# Patient Record
Sex: FEMALE | Race: WHITE | Employment: OTHER | URBAN - METROPOLITAN AREA
[De-identification: names, ages, dates, MRNs, and addresses within clinical notes are randomized per-mention and may not be internally consistent; named-entity substitution may affect disease eponyms.]

---

## 2018-06-19 ENCOUNTER — HOSPITAL ENCOUNTER (INPATIENT)
Facility: HOSPITAL | Age: 33
LOS: 3 days | Discharge: HOME | DRG: 418 | End: 2018-06-22
Attending: EMERGENCY MEDICINE | Admitting: HOSPITALIST
Payer: MEDICARE

## 2018-06-19 ENCOUNTER — APPOINTMENT (OUTPATIENT)
Dept: ULTRASOUND IMAGING | Facility: HOSPITAL | Age: 33
DRG: 418 | End: 2018-06-19
Attending: EMERGENCY MEDICINE
Payer: MEDICARE

## 2018-06-19 DIAGNOSIS — K81.9 CHOLECYSTITIS: ICD-10-CM

## 2018-06-19 DIAGNOSIS — K80.50 CHOLEDOCHOLITHIASIS: Primary | ICD-10-CM

## 2018-06-19 PROBLEM — R79.89 AZOTEMIA: Status: ACTIVE | Noted: 2018-06-19

## 2018-06-19 PROCEDURE — 76700 US EXAM ABDOM COMPLETE: CPT | Performed by: EMERGENCY MEDICINE

## 2018-06-19 PROCEDURE — 99223 1ST HOSP IP/OBS HIGH 75: CPT | Performed by: HOSPITALIST

## 2018-06-19 RX ORDER — SODIUM CHLORIDE 9 MG/ML
INJECTION, SOLUTION INTRAVENOUS CONTINUOUS
Status: DISCONTINUED | OUTPATIENT
Start: 2018-06-19 | End: 2018-06-22

## 2018-06-19 RX ORDER — ONDANSETRON 2 MG/ML
4 INJECTION INTRAMUSCULAR; INTRAVENOUS EVERY 6 HOURS PRN
Status: DISCONTINUED | OUTPATIENT
Start: 2018-06-19 | End: 2018-06-22

## 2018-06-19 RX ORDER — MULTIVITAMIN WITH FOLIC ACID 400 MCG
1 TABLET ORAL DAILY
COMMUNITY

## 2018-06-19 RX ORDER — HYDROMORPHONE HYDROCHLORIDE 1 MG/ML
0.2 INJECTION, SOLUTION INTRAMUSCULAR; INTRAVENOUS; SUBCUTANEOUS EVERY 2 HOUR PRN
Status: DISCONTINUED | OUTPATIENT
Start: 2018-06-19 | End: 2018-06-22

## 2018-06-19 RX ORDER — GARLIC EXTRACT 500 MG
1 CAPSULE ORAL DAILY
COMMUNITY

## 2018-06-19 RX ORDER — ONDANSETRON 2 MG/ML
4 INJECTION INTRAMUSCULAR; INTRAVENOUS EVERY 4 HOURS PRN
Status: DISCONTINUED | OUTPATIENT
Start: 2018-06-19 | End: 2018-06-19

## 2018-06-19 RX ORDER — HYDROMORPHONE HYDROCHLORIDE 1 MG/ML
0.4 INJECTION, SOLUTION INTRAMUSCULAR; INTRAVENOUS; SUBCUTANEOUS EVERY 2 HOUR PRN
Status: DISCONTINUED | OUTPATIENT
Start: 2018-06-19 | End: 2018-06-22

## 2018-06-19 RX ORDER — HYDROMORPHONE HYDROCHLORIDE 1 MG/ML
0.8 INJECTION, SOLUTION INTRAMUSCULAR; INTRAVENOUS; SUBCUTANEOUS EVERY 2 HOUR PRN
Status: DISCONTINUED | OUTPATIENT
Start: 2018-06-19 | End: 2018-06-22

## 2018-06-19 RX ORDER — SODIUM CHLORIDE 9 MG/ML
INJECTION, SOLUTION INTRAVENOUS CONTINUOUS
Status: ACTIVE | OUTPATIENT
Start: 2018-06-19 | End: 2018-06-19

## 2018-06-19 RX ORDER — KETOROLAC TROMETHAMINE 30 MG/ML
15 INJECTION, SOLUTION INTRAMUSCULAR; INTRAVENOUS ONCE
Status: COMPLETED | OUTPATIENT
Start: 2018-06-19 | End: 2018-06-19

## 2018-06-19 RX ORDER — HYDROMORPHONE HYDROCHLORIDE 1 MG/ML
0.5 INJECTION, SOLUTION INTRAMUSCULAR; INTRAVENOUS; SUBCUTANEOUS EVERY 30 MIN PRN
Status: DISCONTINUED | OUTPATIENT
Start: 2018-06-19 | End: 2018-06-19

## 2018-06-19 NOTE — ED INITIAL ASSESSMENT (HPI)
Pt states she had a sudden onset of abdominal pain started 30 minutes ago. Just finished lunch and started with pain. Samir SPENCER.

## 2018-06-19 NOTE — ED PROVIDER NOTES
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   Cardiovascular: Regular rate and rhythm, normal S1-S2. Respiratory: Lungs are clear to auscultation bilaterally. Abdomen: Soft, tender to the mid abdomen, nondistended. Back: No CVA tenderness. Extremities: No edema.   Skin: warm and dry, no they understand and agree. Disposition and Plan     Clinical Impression:  Choledocholithiasis  (primary encounter diagnosis)    Disposition:  Admit  6/19/2018  4:22 pm    Follow-up:  No follow-up provider specified.       Medications Prescribed:  There a

## 2018-06-19 NOTE — ED NOTES
Pt reevaluated by er physician. Pt informed of all test reports and plan of care. Pt verbalizing understanding.

## 2018-06-19 NOTE — H&P
[facility]  History and Physical     Abby Munoz Patient Status:  Emergency    1985 MRN [id number]   Location [address] [facility] Attending Kathryn Hearn MD   Hosp Day # 0 PCP Foreign Silva     Chief Compl Appropriate mood and affect.       Diagnostic Data:      Labs:  Recent Labs   Lab  06/19/18   1347   WBC  5.6   HGB  13.8   MCV  96.7   PLT  209.0       Recent Labs   Lab  06/19/18   1347   GLU  88   BUN  14   CREATSERUM  0.63   GFRAA  136   GFRNAA  118   C

## 2018-06-20 ENCOUNTER — SURGERY (OUTPATIENT)
Age: 33
End: 2018-06-20

## 2018-06-20 ENCOUNTER — APPOINTMENT (OUTPATIENT)
Dept: GENERAL RADIOLOGY | Facility: HOSPITAL | Age: 33
DRG: 418 | End: 2018-06-20
Attending: SURGERY
Payer: MEDICARE

## 2018-06-20 ENCOUNTER — APPOINTMENT (OUTPATIENT)
Dept: MRI IMAGING | Facility: HOSPITAL | Age: 33
DRG: 418 | End: 2018-06-20
Attending: NURSE PRACTITIONER
Payer: MEDICARE

## 2018-06-20 ENCOUNTER — ANESTHESIA (OUTPATIENT)
Dept: SURGERY | Facility: HOSPITAL | Age: 33
End: 2018-06-20

## 2018-06-20 ENCOUNTER — ANESTHESIA EVENT (OUTPATIENT)
Dept: SURGERY | Facility: HOSPITAL | Age: 33
End: 2018-06-20

## 2018-06-20 PROCEDURE — 76376 3D RENDER W/INTRP POSTPROCES: CPT | Performed by: NURSE PRACTITIONER

## 2018-06-20 PROCEDURE — 0FT44ZZ RESECTION OF GALLBLADDER, PERCUTANEOUS ENDOSCOPIC APPROACH: ICD-10-PCS | Performed by: SURGERY

## 2018-06-20 PROCEDURE — 74181 MRI ABDOMEN W/O CONTRAST: CPT | Performed by: NURSE PRACTITIONER

## 2018-06-20 PROCEDURE — 99232 SBSQ HOSP IP/OBS MODERATE 35: CPT | Performed by: HOSPITALIST

## 2018-06-20 PROCEDURE — 74300 X-RAY BILE DUCTS/PANCREAS: CPT | Performed by: SURGERY

## 2018-06-20 PROCEDURE — 99223 1ST HOSP IP/OBS HIGH 75: CPT | Performed by: SURGERY

## 2018-06-20 PROCEDURE — BF131ZZ FLUOROSCOPY OF GALLBLADDER AND BILE DUCTS USING LOW OSMOLAR CONTRAST: ICD-10-PCS | Performed by: SURGERY

## 2018-06-20 PROCEDURE — 76377 3D RENDER W/INTRP POSTPROCES: CPT | Performed by: NURSE PRACTITIONER

## 2018-06-20 RX ORDER — BUPIVACAINE HYDROCHLORIDE 5 MG/ML
INJECTION, SOLUTION EPIDURAL; INTRACAUDAL AS NEEDED
Status: DISCONTINUED | OUTPATIENT
Start: 2018-06-20 | End: 2018-06-20 | Stop reason: HOSPADM

## 2018-06-20 RX ORDER — CEFOXITIN 1 G/1
INJECTION, POWDER, FOR SOLUTION INTRAVENOUS
Status: DISCONTINUED | OUTPATIENT
Start: 2018-06-20 | End: 2018-06-20 | Stop reason: HOSPADM

## 2018-06-20 RX ORDER — METOCLOPRAMIDE HYDROCHLORIDE 5 MG/ML
10 INJECTION INTRAMUSCULAR; INTRAVENOUS AS NEEDED
Status: DISCONTINUED | OUTPATIENT
Start: 2018-06-20 | End: 2018-06-20 | Stop reason: HOSPADM

## 2018-06-20 RX ORDER — SODIUM CHLORIDE, SODIUM LACTATE, POTASSIUM CHLORIDE, CALCIUM CHLORIDE 600; 310; 30; 20 MG/100ML; MG/100ML; MG/100ML; MG/100ML
INJECTION, SOLUTION INTRAVENOUS CONTINUOUS
Status: DISCONTINUED | OUTPATIENT
Start: 2018-06-20 | End: 2018-06-20 | Stop reason: HOSPADM

## 2018-06-20 RX ORDER — NALOXONE HYDROCHLORIDE 0.4 MG/ML
80 INJECTION, SOLUTION INTRAMUSCULAR; INTRAVENOUS; SUBCUTANEOUS AS NEEDED
Status: DISCONTINUED | OUTPATIENT
Start: 2018-06-20 | End: 2018-06-20 | Stop reason: HOSPADM

## 2018-06-20 RX ORDER — HYDROMORPHONE HYDROCHLORIDE 1 MG/ML
0.5 INJECTION, SOLUTION INTRAMUSCULAR; INTRAVENOUS; SUBCUTANEOUS EVERY 5 MIN PRN
Status: DISCONTINUED | OUTPATIENT
Start: 2018-06-20 | End: 2018-06-20 | Stop reason: HOSPADM

## 2018-06-20 RX ORDER — ONDANSETRON 2 MG/ML
4 INJECTION INTRAMUSCULAR; INTRAVENOUS AS NEEDED
Status: DISCONTINUED | OUTPATIENT
Start: 2018-06-20 | End: 2018-06-20 | Stop reason: HOSPADM

## 2018-06-20 NOTE — PROGRESS NOTES
06/20/18 1147   Clinical Encounter Type   Visited With Patient and family together   Routine Visit Introduction   Continue Visiting No   Surgical Visit Pre-op   Judaism Encounters   Spiritual Requests During Visit / Hospitalization [facility]

## 2018-06-20 NOTE — PLAN OF CARE
PAIN - ADULT    • Verbalizes/displays adequate comfort level or patient's stated pain goal Progressing        Patient/Family Goals    • Patient/Family Long Term Goal Progressing    • Patient/Family Short Term Goal Progressing            Patient A&O, VSS.  P

## 2018-06-20 NOTE — PROGRESS NOTES
Vss. Pt resting in bed comfortably. dialudid given for abdominal pain with good relief. Pt denies n/v at present. Abdomen soft but rounded. bsx4 present on ausculation. Pt reports last bm was this am. Pt dtv on floor denies need to at present.  ivf infusing

## 2018-06-20 NOTE — PAYOR COMM NOTE
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    Greg Kaba

## 2018-06-20 NOTE — PROGRESS NOTES
SPOKE WITH DR. OSBORNE REGARDING SURGICAL CONSULT AND NOTIFIED HIM ABOUT MRCP RESULTS. Chace Smith SPOKE WITH PATIENT AND TOLD HER ABOUT MRCP RESULTS AND PLAN OF CARE. HE WILL CALL & TALK TO  ONE OF PATIENT'S PARENTS AS WELL.

## 2018-06-20 NOTE — PROGRESS NOTES
[facility]  Progress Note     Mariana Mc Patient Status:  Inpatient    1985 MRN [id number]   [facility] 3NW-A Attending Michelle Denny DO   Hosp Day # 1 PCP [address] [facility]     Chief Complaint: Abdominal pain    S: control  2. Transaminitis secondary to #1  1. Monitor  3.  Prader-Vladimir syndrome    Quality:  · DVT Prophylaxis: SCD  · CODE status: full  · Silva: no  · Central line: no    Estimated date of discharge: TBD  Discharge is dependent on: clinical progress  At

## 2018-06-20 NOTE — CONSULTS
[facility]                       Gastroenterology Consultation-[facility]    [facility] Patient Status:  Inpatient    1985 MRN [id number]   [facility] 3NW-A Attending Douglas Zepeda, [address] [facility] Day # 1 PCP BR substances. FamHx: The patient has no family history of colon cancer or other gastrointestinal malignancies; No family history of ulcer disease, or inflammatory bowel disease  ROS:  In addition to the pertinent positives described above:             Infec non-distended with the presence of bowel sounds; No hepatosplenomegaly; no rebound or guarding;  No ascites is clinically apparent; no tympany to percussion  Ext: No peripheral edema or cyanosis  Skin: Warm and dry  Psychiatric: Appropriate mood and congrue Gallstones with nonspecific gallbladder wall thickening. Common bile duct diameter is distended measuring 11 mm. Echogenic material within the common duct, concerning for choledocholithiasis. PANCREAS:  Normal visualized portions.   Pancreatic tail is ob you.  REBA Hampton  10:05 AM  6/20/2018  [facility]  [phone]    Attending physician addendum:   I personally saw and examined the patient, agree with the above findings, assessment and plan.     Vera Aguiar is a 35year-old

## 2018-06-21 ENCOUNTER — APPOINTMENT (OUTPATIENT)
Dept: GENERAL RADIOLOGY | Facility: HOSPITAL | Age: 33
DRG: 418 | End: 2018-06-21
Attending: INTERNAL MEDICINE
Payer: MEDICARE

## 2018-06-21 ENCOUNTER — ANESTHESIA (OUTPATIENT)
Dept: ENDOSCOPY | Facility: HOSPITAL | Age: 33
DRG: 418 | End: 2018-06-21
Payer: MEDICARE

## 2018-06-21 ENCOUNTER — ANESTHESIA EVENT (OUTPATIENT)
Dept: ENDOSCOPY | Facility: HOSPITAL | Age: 33
DRG: 418 | End: 2018-06-21
Payer: MEDICARE

## 2018-06-21 ENCOUNTER — SURGERY (OUTPATIENT)
Age: 33
End: 2018-06-21

## 2018-06-21 PROCEDURE — 74328 X-RAY BILE DUCT ENDOSCOPY: CPT | Performed by: INTERNAL MEDICINE

## 2018-06-21 PROCEDURE — 99232 SBSQ HOSP IP/OBS MODERATE 35: CPT | Performed by: HOSPITALIST

## 2018-06-21 PROCEDURE — 0FC98ZZ EXTIRPATION OF MATTER FROM COMMON BILE DUCT, VIA NATURAL OR ARTIFICIAL OPENING ENDOSCOPIC: ICD-10-PCS | Performed by: INTERNAL MEDICINE

## 2018-06-21 RX ORDER — NALOXONE HYDROCHLORIDE 0.4 MG/ML
80 INJECTION, SOLUTION INTRAMUSCULAR; INTRAVENOUS; SUBCUTANEOUS AS NEEDED
Status: ACTIVE | OUTPATIENT
Start: 2018-06-21 | End: 2018-06-21

## 2018-06-21 RX ORDER — SODIUM CHLORIDE, SODIUM LACTATE, POTASSIUM CHLORIDE, CALCIUM CHLORIDE 600; 310; 30; 20 MG/100ML; MG/100ML; MG/100ML; MG/100ML
INJECTION, SOLUTION INTRAVENOUS CONTINUOUS
Status: DISCONTINUED | OUTPATIENT
Start: 2018-06-21 | End: 2018-06-22

## 2018-06-21 RX ORDER — HYDROCODONE BITARTRATE AND ACETAMINOPHEN 5; 325 MG/1; MG/1
1 TABLET ORAL EVERY 6 HOURS PRN
Status: DISCONTINUED | OUTPATIENT
Start: 2018-06-21 | End: 2018-06-22

## 2018-06-21 NOTE — H&P (VIEW-ONLY)
[facility]                       Gastroenterology Consultation[facility]    Mel Abarca Patient Status:  Inpatient    1985 MRN [id number]   [facility] 3NW-A Attending Lucía Del Angel, 160[address] [facility] Day # 1 PCP BR substances. FamHx: The patient has no family history of colon cancer or other gastrointestinal malignancies; No family history of ulcer disease, or inflammatory bowel disease  ROS:  In addition to the pertinent positives described above:             Infec non-distended with the presence of bowel sounds; No hepatosplenomegaly; no rebound or guarding;  No ascites is clinically apparent; no tympany to percussion  Ext: No peripheral edema or cyanosis  Skin: Warm and dry  Psychiatric: Appropriate mood and congrue Gallstones with nonspecific gallbladder wall thickening. Common bile duct diameter is distended measuring 11 mm. Echogenic material within the common duct, concerning for choledocholithiasis. PANCREAS:  Normal visualized portions.   Pancreatic tail is ob you.  REBA Hampton  10:05 AM  6/20/2018  [facility]  [phone]    Attending physician addendum:   I personally saw and examined the patient, agree with the above findings, assessment and plan.     Vera Aguiar is a 35year-old

## 2018-06-21 NOTE — PROGRESS NOTES
[facility]  Progress Note    Bakari Cifuentes Patient Status:  Inpatient    1985 MRN [id number]   [facility] 3NW-A Attending Mandi Shelley, [address] [facility] Day # 2 PCP Andrzej OREILLY     Subjective:  Patient awake and alert.   Sta 06/21/2018   ALB 3.3 06/21/2018   TP 6.9 06/21/2018       Lab Results  Component Value Date   MG 1.9 06/21/2018       Assessment/Plan:  Patient Active Problem List:     Azotemia     Choledocholithiasis     Acalculous cholecystitis     Prader-Willi syndrome

## 2018-06-21 NOTE — INTERVAL H&P NOTE
Pre-op Diagnosis: inpt    The above referenced H&P was reviewed by Matias Burnham DO on 6/21/2018, the patient was examined and no significant changes have occurred in the patient's condition since the H&P was performed.   I discussed with the patient and

## 2018-06-21 NOTE — CONSULTS
Neetu Savage is a 35year old female  Patient presents with:  Abdomen/Flank Pain (GI/)      REFERRED BY    Patient presents with epigastric abdominal pain onset yesterday afternoon after eating lunch. Patient states she had a protein shake.   And Blood pressure (!) 161/95, pulse 65, temperature 98.6 °F (37 °C), temperature source Oral, resp. rate 18, height 5' (1.524 m), weight 163 lb (73.9 kg), last menstrual period 06/05/2018, SpO2 100 %, not currently breastfeeding.   GENERAL: well developed, OUTPUT  INTAKE AND OUTPUT  INTAKE AND OUTPUT  NURSING COMMUNICATION  US ABDOMEN COMPLETE (CPT=76700)  MRI MRCP W/3D ONLY (CPT=76376/01051)  IP CONSULT TO HOSPITALIST  IP CONSULT TO GASTROENTEROLOGY  IP CONSULT TO GENERAL SURGERY  BED REQUEST  FULL CODE  AD

## 2018-06-21 NOTE — ANESTHESIA PREPROCEDURE EVALUATION
PRE-OP EVALUATION    Patient Name: Mariana Mc    Pre-op Diagnosis: inpt    Procedure(s):  ENDOSCOPIC RETROGRADE CHOLANGIOPANCREATOGRAPHY    Surgeon(s) and Role:     * Hussein Barber, DO - Vidya    Pre-op vitals reviewed.   Temp: 98.2 °F (36.8 tobacco: Never Used    Alcohol use Not on file       Drug use: Unknown     Available pre-op labs reviewed.     Lab Results  Component Value Date   WBC 7.2 06/21/2018   RBC 3.83 06/21/2018   HGB 11.9 (L) 06/21/2018   HCT 37.5 06/21/2018   MCV 97.9 06/21/2018

## 2018-06-21 NOTE — PROGRESS NOTES
[facility]  Progress Note     Maciej Bowman Patient Status:  Inpatient    1985 MRN [id number]   [facility] 3NW-A Attending Jessica Carrillo, [address] [facility] Day # 2 PCP 2234 [facility]     Chief Complaint: Abdominal pain    S: indomethacin           ASSESSMENT / PLAN:     1. Biliary colic s/p lap satya 55/41  1. Management per surgery   2. Choledocholithiasis/+IOC   1. S/p ERCP with sphincterotomy and stone extraction  2. GI following  3. Transaminitis secondary to above  1.  Mon

## 2018-06-21 NOTE — BRIEF OP NOTE
Pre-Operative Diagnosis: Cholecystitis [K81.9]     Post-Operative Diagnosis: same with choledocholithiasis     Procedure Performed:   Procedure(s):  LAPAROSCOPIC CHOLECYSTECTOMY WITH INTRAOPERATIVE CHOLANGIIOGRAM     Surgeon(s) and Role:     * Judy Doyle

## 2018-06-21 NOTE — ANESTHESIA PREPROCEDURE EVALUATION
PRE-OP EVALUATION    Patient Name: Manda Trujillo    Pre-op Diagnosis: Cholecystitis [K81.9]    Procedure(s):  LAPAROSCOPIC CHOLECYSTECTOMY WITH INTRAOPERATIVE CHOLANGIIOGRAM     Surgeon(s) and Role:     Yuridia Alanis, DO - Primary    Pre-op vital use Not on file       Drug use: Unknown     Available pre-op labs reviewed.     Lab Results  Component Value Date   WBC 2.8 (L) 06/20/2018   RBC 3.56 (L) 06/20/2018   HGB 11.2 (L) 06/20/2018   HCT 35.0 06/20/2018   MCV 98.3 06/20/2018   MCH 31.5 06/20/2018

## 2018-06-21 NOTE — OPERATIVE REPORT
[address]    PATIENT'S NAME: Scotty Schwartz   ATTENDING PHYSICIAN: Sherley Orosco.  Kandace Carlson D.O.   OPERATING PHYSICIAN: Martha Arteaga D.O.   PATIENT ACCOUNT#:   [id number]    LOCATION:  PACU [address] [place], [place] [address]U [facility]  MEDICAL RECORD #:   [id number]       DATE OF duodenum. Cholangiography was terminated. Three clips placed on the proximal cystic duct, and it was divided. The cystic artery was identified, clipped twice proximally, once distally, and it was divided.   The gallbladder was then bluntly and sharply

## 2018-06-21 NOTE — OPERATIVE REPORT
Corinna Grimm Patient Status:  Inpatient    1985 MRN [id number]   [facility] ENDOSCOPY Attending Sunni Higgins, [address] [facility] Day # 2 PCP YONATHAN OREILLY     PREOPERATIVE DIAGNOSIS/INDICATION: Filling defect on intraoperativ CBD cannulation. The CBD was dilated measuring 6 mm. Biliary sphincterotomy was performed using ERBE endocut electrocautery. There was no post sphincterotomy bleeding.  The wire was left in place and the sphincterotome was exchanged for a 9-12 mm balloon

## 2018-06-21 NOTE — ANESTHESIA POSTPROCEDURE EVALUATION
[facility]    Tanner Jefferson Patient Status:  Inpatient   Age/Gender 35year old female MRN [id number]   [facility] SURGERY Attending Shari Powell, 160[address] [facility] Day # 1 PCP 2234 [facility]       Anesthesia Post-op Note    Procedure

## 2018-06-21 NOTE — ANESTHESIA POSTPROCEDURE EVALUATION
[facility]    Deandre Lambert Patient Status:  Inpatient   Age/Gender 35year old female MRN [id number]   Location [address] [facility]. Attending Sandee Belle, [address] [facility] Day # 2 PCP 2234 [facility]       Anesthesia Post-op Note    Procedu

## 2018-06-22 VITALS
RESPIRATION RATE: 18 BRPM | DIASTOLIC BLOOD PRESSURE: 98 MMHG | HEART RATE: 77 BPM | WEIGHT: 163 LBS | OXYGEN SATURATION: 97 % | HEIGHT: 60 IN | TEMPERATURE: 98 F | SYSTOLIC BLOOD PRESSURE: 129 MMHG | BODY MASS INDEX: 32 KG/M2

## 2018-06-22 PROCEDURE — 99239 HOSP IP/OBS DSCHRG MGMT >30: CPT | Performed by: HOSPITALIST

## 2018-06-22 RX ORDER — POLYETHYLENE GLYCOL 3350 17 G/17G
17 POWDER, FOR SOLUTION ORAL DAILY PRN
Status: DISCONTINUED | OUTPATIENT
Start: 2018-06-22 | End: 2018-06-22

## 2018-06-22 RX ORDER — DOCUSATE SODIUM 100 MG/1
100 CAPSULE, LIQUID FILLED ORAL 2 TIMES DAILY
Status: DISCONTINUED | OUTPATIENT
Start: 2018-06-22 | End: 2018-06-22

## 2018-06-22 RX ORDER — HYDROCODONE BITARTRATE AND ACETAMINOPHEN 5; 325 MG/1; MG/1
1-2 TABLET ORAL EVERY 6 HOURS PRN
Qty: 20 TABLET | Refills: 0 | Status: SHIPPED | OUTPATIENT
Start: 2018-06-22 | End: 2020-10-27 | Stop reason: ALTCHOICE
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
## 2018-06-22 NOTE — DISCHARGE SUMMARY
[facility]  DISCHARGE SUMMARY     Pierce Iniguez Patient Status:  Inpatient    1985 MRN [id number]   [facility] 3NW-A Attending Don Jacobs, DO   Hosp Day # 3 PCP Hugo Hinojosa     Date of Admission: 2018  Date Instructions Prescription details   HYDROcodone-acetaminophen 5-325 MG Tabs  Commonly known as:  NORCO      Take 1-2 tablets by mouth every 6 (six) hours as needed.    Quantity:  20 tablet  Refills:  0        CONTINUE taking these medications      Instructi

## 2018-06-22 NOTE — PLAN OF CARE
VSS. A&O. Lap sites x3 cdi with steri strips. MONSERRAT site intact, draining serosang drainage. Stable on 2L O2, pulse ox maintained, needs encouragement with IS. SCD in place. Tele reading NSR.  Tolerating low fiber diet, denies passing flatus, abd rounded and s

## 2018-06-22 NOTE — DISCHARGE SUMMARY
[facility]  Discharge Summary    Jany Michael Patient Status:  Inpatient    1985 MRN [id number]   [facility] 3NW-A Attending Aimee Gan, DO   Hosp Day # 3 JEAN Serrano     Date of Admission: 2018    Date o Spiritual Care    Complications: none     Disposition: Home to self care   Discharge Condition: Good    Discharge Medications: Current Discharge Medication List    START taking these medications    HYDROcodone-acetaminophen (NORCO) 5-325 MG Oral Tab  Take

## 2018-06-22 NOTE — PROGRESS NOTES
[facility]  Progress Note     Edmar Rockwell Patient Status:  Inpatient    1985 MRN [id number]   [facility] 3NW-A Attending Mary Figueroa, [address] [facility] Day # 3 PCP 2234 [facility]     Chief Complaint: Abdominal pain    S: Epic.    Medications:   • docusate sodium  100 mg Oral BID       ASSESSMENT / PLAN:     1. Biliary colic s/p lap satya 51/64  1. Management per surgery   2. Choledocholithiasis/+IOC   1. S/p ERCP with sphincterotomy and stone extraction  2.  GI following  3

## 2018-06-25 ENCOUNTER — TELEPHONE (OUTPATIENT)
Dept: SURGERY | Facility: CLINIC | Age: 33
End: 2018-06-25

## 2018-06-25 NOTE — TELEPHONE ENCOUNTER
Mother calling because daughter had Lap Melanie 5 days ago and has a stiff neck and developed a rash on her abdomen yesterday. Encouraged to take OTC oral benedryl and use an anti-itch lotion, to take off steri strips and call back if not improved or worse.

## 2018-06-28 ENCOUNTER — OFFICE VISIT (OUTPATIENT)
Dept: SURGERY | Facility: CLINIC | Age: 33
End: 2018-06-28

## 2018-06-28 VITALS
HEIGHT: 61.5 IN | DIASTOLIC BLOOD PRESSURE: 64 MMHG | SYSTOLIC BLOOD PRESSURE: 110 MMHG | TEMPERATURE: 98 F | WEIGHT: 160 LBS | HEART RATE: 76 BPM | BODY MASS INDEX: 29.82 KG/M2

## 2018-06-28 DIAGNOSIS — K81.9 ACALCULOUS CHOLECYSTITIS: Primary | ICD-10-CM

## 2018-06-28 DIAGNOSIS — K80.50 BILIARY COLIC: ICD-10-CM

## 2018-06-28 PROCEDURE — 99024 POSTOP FOLLOW-UP VISIT: CPT | Performed by: PHYSICIAN ASSISTANT

## 2018-06-28 RX ORDER — DIPHENHYDRAMINE HCL 25 MG
25 CAPSULE ORAL EVERY 4 HOURS PRN
COMMUNITY
End: 2020-10-27 | Stop reason: ALTCHOICE

## 2018-06-28 NOTE — PROGRESS NOTES
Post Operative Visit Note       Active Problems  1. Acalculous cholecystitis    2. Biliary colic         Chief Complaint   Patient presents with:  Post-Op: lap satya done with Dr. Marco Isaac on 6/20.  pt c/o of rash on abd and groin area x 3 days      History of Disp:  Rfl:    Multiple Vitamin (TAB-A-CHELI) Oral Tab Take 1 tablet by mouth daily. Disp:  Rfl:    Acidophilus/Pectin Oral Cap Take 1 capsule by mouth daily.  Disp:  Rfl:    HYDROcodone-acetaminophen (NORCO) 5-325 MG Oral Tab Take 1-2 tablets by mouth every Abdominal exam: Soft, nontender, nondistended, good bowel sounds. Incisions: All laparoscopic incisions are clean, dry, intact, without erythema, or cellulitis.   The patient's abdomen, most specifically the left upper quadrant, demonstrates a very an as-needed basis. This patient should return urgently for any problems or complications related to the surgical intervention. The patient voiced understanding and agreement with this plan.            No orders of the defined types were placed in this e

## 2021-02-03 PROBLEM — K80.50 CHOLEDOCHOLITHIASIS: Status: RESOLVED | Noted: 2018-06-19 | Resolved: 2021-02-03

## 2021-06-26 NOTE — PROGRESS NOTES
06/21/18 0932   Clinical Encounter Type   Visited With (Clergy)   Mu-ism Encounters   Mu-ism Needs (Clergy support being provided this morning by [employer] from [address] [place] [address].) no

## (undated) DEVICE — CAUTERY PENCIL

## (undated) DEVICE — TIGERTAIL 5F FLXTIP 70CM

## (undated) DEVICE — DRAPE C-ARM UNIVERSAL

## (undated) DEVICE — KENDALL SCD EXPRESS SLEEVES, KNEE LENGTH, MEDIUM: Brand: KENDALL SCD

## (undated) DEVICE — DISPOSABLE LAPAROSCOPIC CLIP APPLIER WITH 20 CLIPS.: Brand: EPIX® UNIVERSAL CLIP APPLIER

## (undated) DEVICE — RETRIEVAL BALLOON CATHETER: Brand: EXTRACTOR™ PRO RX

## (undated) DEVICE — SPHINCTEROTOME: Brand: HYDRATOME RX 44

## (undated) DEVICE — GLOVE BIOGEL M SURG SZ 71/2

## (undated) DEVICE — ENDOSCOPY PACK UPPER: Brand: MEDLINE INDUSTRIES, INC.

## (undated) DEVICE — CHLORAPREP 26ML APPLICATOR

## (undated) DEVICE — LAP CHOLE/APPY CDS-LF: Brand: MEDLINE INDUSTRIES, INC.

## (undated) DEVICE — VIOLET BRAIDED (POLYGLACTIN 910), SYNTHETIC ABSORBABLE SUTURE: Brand: COATED VICRYL

## (undated) DEVICE — TROCARS: Brand: KII® BALLOON BLUNT TIP SYSTEM

## (undated) DEVICE — TISSUE RETRIEVAL SYSTEM: Brand: INZII RETRIEVAL SYSTEM

## (undated) DEVICE — SUTURE MONOCRYL 4-0 PS-2

## (undated) DEVICE — FILTERLINE NASAL ADULT O2/CO2

## (undated) DEVICE — LOCKING DEVICE RX & BIOPSY CAP

## (undated) DEVICE — REM POLYHESIVE ADULT PATIENT RETURN ELECTRODE: Brand: VALLEYLAB

## (undated) DEVICE — MEDI-VAC SUCTION HANDLE REGULAR CAPACITY: Brand: CARDINAL HEALTH

## (undated) DEVICE — TROCAR: Brand: KII® SLEEVE

## (undated) DEVICE — MEDI-VAC NON-CONDUCTIVE SUCTION TUBING: Brand: CARDINAL HEALTH

## (undated) DEVICE — 3M™ RED DOT™ MONITORING ELECTRODE WITH FOAM TAPE AND STICKY GEL, 50/BAG, 20/CASE, 72/PLT 2570: Brand: RED DOT™

## (undated) DEVICE — TROCAR: Brand: KII SHIELDED BLADED ACCESS SYSTEM

## (undated) DEVICE — Device

## (undated) DEVICE — 1200CC GUARDIAN II: Brand: GUARDIAN

## (undated) DEVICE — NITINOL WIRE STR 035

## (undated) NOTE — LETTER
[facility]  Melody Fitzpatrick 61 [address] [facility], [address] [place] [address]    Consent for Operation    Date: June 20,2018__________________    Time: _______________    1.  I authorize the performance upon Maciej Bowman the following operation:    LAPAROSCOPIC HALEY videotaped, the surgeon will obtain the original videotape. The hospital will not be responsible for storage or maintenance of this tape. 6. For the purpose of advancing medical education, I consent to the admittance of observers to the Operating Room. IN.    Signature of Patient:   ___________________________    When the patient is a minor or mentally incompetent to give consent:  Signature of person authorized to consent for patient: ___________________________   Relationship to patient: ______________ anesthesiologist about these medicines may increase my risk of anesthetic complications. · If I am allergic to anything or have had a reaction to anesthesia before. 3. I understand how the anesthesia medicine will help me (benefits).     4. I understand answered their questions. The patient or their representative has agreed to have anesthesia services.     _____________________________________________________________________________  Witness        Date   Time  I have verified that the signature is that o

## (undated) NOTE — LETTER
[facility]  Melody Fitzpatrick [address] [facility], [address] [place] [address]    Consent for Operation    Date: __________________    Time: _______________    1.  I authorize the performance upon Paul Dalton the following operation:    Procedure(s):  LAPAROSCOPIC [facility] procedure has been videotaped, the surgeon will obtain the original videotape. The hospital will not be responsible for storage or maintenance of this tape.     6. For the purpose of advancing medical education, I consent to the admittance of observers to t STATEMENTS REQUIRING INSERTION OR COMPLETION WERE FILLED IN.     Signature of Patient:   ___________________________    When the patient is a minor or mentally incompetent to give consent:  Signature of person authorized to consent for patient: ____________ supplements, and pills I can buy without a prescription (including street drugs/illegal medications). Failure to inform my anesthesiologist about these medicines may increase my risk of anesthetic complications.   · If I am allergic to anything or have had Anesthesiologist Signature     Date   Time  I have discussed the procedure and information above with the patient (or patient’s representative) and answered their questions. The patient or their representative has agreed to have anesthesia services.     ___

## (undated) NOTE — LETTER
[facility]  Melody Fitzpatrick 61 [address] [facility], [address] [place] [address]    Consent for Operation    Date: __________________    Time: _______________    1.  I authorize the performance upon Mally Borja the following operation:    Procedure(s):Endoscopic retrog videotaped, the surgeon will obtain the original videotape. The hospital will not be responsible for storage or maintenance of this tape. 6. For the purpose of advancing medical education, I consent to the admittance of observers to the Operating Room. IN.    Signature of Patient:   ___________________________    When the patient is a minor or mentally incompetent to give consent:  Signature of person authorized to consent for patient: ___________________________   Relationship to patient: ______________ anesthesiologist about these medicines may increase my risk of anesthetic complications. · If I am allergic to anything or have had a reaction to anesthesia before. 3. I understand how the anesthesia medicine will help me (benefits).     4. I understand answered their questions. The patient or their representative has agreed to have anesthesia services.     _____________________________________________________________________________  Witness        Date   Time  I have verified that the signature is that o